# Patient Record
Sex: FEMALE
[De-identification: names, ages, dates, MRNs, and addresses within clinical notes are randomized per-mention and may not be internally consistent; named-entity substitution may affect disease eponyms.]

---

## 2022-08-29 PROBLEM — Z00.00 ENCOUNTER FOR PREVENTIVE HEALTH EXAMINATION: Status: ACTIVE | Noted: 2022-08-29

## 2022-09-14 ENCOUNTER — APPOINTMENT (OUTPATIENT)
Dept: OTOLARYNGOLOGY | Facility: CLINIC | Age: 57
End: 2022-09-14

## 2022-09-14 VITALS — BODY MASS INDEX: 23.9 KG/M2 | TEMPERATURE: 97 F | HEIGHT: 64 IN | WEIGHT: 140 LBS

## 2022-09-14 DIAGNOSIS — Z72.89 OTHER PROBLEMS RELATED TO LIFESTYLE: ICD-10-CM

## 2022-09-14 DIAGNOSIS — H93.293 OTHER ABNORMAL AUDITORY PERCEPTIONS, BILATERAL: ICD-10-CM

## 2022-09-14 DIAGNOSIS — Z86.39 PERSONAL HISTORY OF OTHER ENDOCRINE, NUTRITIONAL AND METABOLIC DISEASE: ICD-10-CM

## 2022-09-14 PROCEDURE — 92557 COMPREHENSIVE HEARING TEST: CPT

## 2022-09-14 PROCEDURE — 99203 OFFICE O/P NEW LOW 30 MIN: CPT

## 2022-09-14 PROCEDURE — 92567 TYMPANOMETRY: CPT

## 2022-09-15 PROBLEM — Z86.39 HISTORY OF HIGH CHOLESTEROL: Status: RESOLVED | Noted: 2022-09-14 | Resolved: 2022-09-15

## 2022-09-15 PROBLEM — H93.293 ABNORMAL AUDITORY PERCEPTION OF BOTH EARS: Status: ACTIVE | Noted: 2022-09-15

## 2022-09-15 PROBLEM — Z72.89 CONSUMES ALCOHOL: Status: ACTIVE | Noted: 2022-09-14

## 2022-09-15 NOTE — ASSESSMENT
[FreeTextEntry1] : Abnormal auditory perception.  I reassured her that her audiogram is quantitatively normal.  She is not a candidate for augmentation at this time.  Follow-up as needed

## 2022-09-15 NOTE — HISTORY OF PRESENT ILLNESS
[de-identified] : Patient reports possible hearing loss for "a wild" her sister told her she may have difficulty hearing.  She has concerns over her ability to hear at conferences.  She has not had any recent audiograms.  Notes a family history of hearing loss in father and mother, but not sure exactly at what age

## 2024-07-02 ENCOUNTER — APPOINTMENT (OUTPATIENT)
Dept: OTOLARYNGOLOGY | Facility: CLINIC | Age: 59
End: 2024-07-02

## 2024-07-02 VITALS — HEIGHT: 64 IN | WEIGHT: 140 LBS | BODY MASS INDEX: 23.9 KG/M2

## 2024-07-02 DIAGNOSIS — J31.0 CHRONIC RHINITIS: ICD-10-CM

## 2024-07-02 DIAGNOSIS — R05.9 COUGH, UNSPECIFIED: ICD-10-CM

## 2024-07-02 DIAGNOSIS — K21.9 GASTRO-ESOPHAGEAL REFLUX DISEASE W/OUT ESOPHAGITIS: ICD-10-CM

## 2024-07-02 PROCEDURE — 99213 OFFICE O/P EST LOW 20 MIN: CPT | Mod: 25

## 2024-07-02 PROCEDURE — 31231 NASAL ENDOSCOPY DX: CPT

## 2024-07-02 RX ORDER — FAMOTIDINE 40 MG/1
40 TABLET, FILM COATED ORAL
Qty: 30 | Refills: 5 | Status: ACTIVE | COMMUNITY
Start: 2024-07-02 | End: 1900-01-01

## 2024-07-27 ENCOUNTER — RX CHANGE (OUTPATIENT)
Age: 59
End: 2024-07-27

## 2024-07-27 RX ORDER — FAMOTIDINE 40 MG/1
40 TABLET, FILM COATED ORAL
Qty: 90 | Refills: 2 | Status: ACTIVE | COMMUNITY
Start: 1900-01-01 | End: 1900-01-01

## 2024-08-06 ENCOUNTER — APPOINTMENT (OUTPATIENT)
Dept: OTOLARYNGOLOGY | Facility: CLINIC | Age: 59
End: 2024-08-06